# Patient Record
Sex: FEMALE | Race: WHITE | URBAN - METROPOLITAN AREA
[De-identification: names, ages, dates, MRNs, and addresses within clinical notes are randomized per-mention and may not be internally consistent; named-entity substitution may affect disease eponyms.]

---
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
## 2020-02-28 ENCOUNTER — OFFICE VISIT (OUTPATIENT)
Dept: FAMILY MEDICINE CLINIC | Facility: CLINIC | Age: 20
End: 2020-02-28
Payer: COMMERCIAL

## 2020-02-28 VITALS
WEIGHT: 125.81 LBS | RESPIRATION RATE: 20 BRPM | HEART RATE: 80 BPM | HEIGHT: 65 IN | TEMPERATURE: 97 F | SYSTOLIC BLOOD PRESSURE: 110 MMHG | OXYGEN SATURATION: 98 % | DIASTOLIC BLOOD PRESSURE: 80 MMHG | BODY MASS INDEX: 20.96 KG/M2

## 2020-02-28 DIAGNOSIS — G43.109 MIGRAINE WITH AURA AND WITHOUT STATUS MIGRAINOSUS, NOT INTRACTABLE: Primary | ICD-10-CM

## 2020-02-28 PROCEDURE — 99214 OFFICE O/P EST MOD 30 MIN: CPT | Performed by: NURSE PRACTITIONER

## 2020-02-28 NOTE — PATIENT INSTRUCTIONS
follow up with eye doctor for exam     Try Excedrin migraine     Chiropractor, Dr. Carroll Ng [phone]     If weakness, numbness, tingling, slurred speech, drooping on one side of your face or vision changes - call 911     Preventing Migraine He · Avoid triggers if you can. For example, stay clear of alcohol and foods that trigger your headaches. Use unscented household products. Keep regular sleep habits. Manage stress to help control emotional triggers.   · Change your behavior at times when trig

## 2020-02-28 NOTE — PROGRESS NOTES
Lisa Varma is a 23year old female. Patient presents with:   Other: fingers are numb and tingling at times during the migranes, slurs words,   Migraine      HPI:   Complaints of migraines - states she started having then a few years ago once every few domenico NEURO:Cranial nerves II through XII grossly intact, +2 DTR's to all 4 extremities, sensation intact, +5/5 strength to all 4 extremities  PSYCH: alert and oriented x 3 well-groomed, good eye contact, bright affect.   MUSCULOSKELETAL: +5/5 strength all 4 extr · Sleep disruption. Staying up late, sleeping late, and traveling across time zones can disrupt your sleep cycle, triggering headaches. · Hormones. Many women notice that migraines tend to happen at a certain point in their menstrual cycle.  Birth control

## 2021-01-30 ENCOUNTER — OFFICE VISIT (OUTPATIENT)
Dept: FAMILY MEDICINE CLINIC | Facility: CLINIC | Age: 21
End: 2021-01-30
Payer: COMMERCIAL

## 2021-01-30 VITALS
SYSTOLIC BLOOD PRESSURE: 102 MMHG | DIASTOLIC BLOOD PRESSURE: 78 MMHG | RESPIRATION RATE: 16 BRPM | WEIGHT: 145.63 LBS | OXYGEN SATURATION: 98 % | BODY MASS INDEX: 24.26 KG/M2 | HEIGHT: 65 IN | HEART RATE: 89 BPM | TEMPERATURE: 99 F

## 2021-01-30 DIAGNOSIS — L70.0 ACNE VULGARIS: Primary | ICD-10-CM

## 2021-01-30 PROCEDURE — 3074F SYST BP LT 130 MM HG: CPT | Performed by: NURSE PRACTITIONER

## 2021-01-30 PROCEDURE — 99213 OFFICE O/P EST LOW 20 MIN: CPT | Performed by: NURSE PRACTITIONER

## 2021-01-30 PROCEDURE — 3078F DIAST BP <80 MM HG: CPT | Performed by: NURSE PRACTITIONER

## 2021-01-30 PROCEDURE — 3008F BODY MASS INDEX DOCD: CPT | Performed by: NURSE PRACTITIONER

## 2021-01-30 RX ORDER — ETONOGESTREL 68 MG/1
IMPLANT SUBCUTANEOUS
COMMUNITY

## 2021-01-30 RX ORDER — DOXYCYCLINE HYCLATE 50 MG/1
50 TABLET, DELAYED RELEASE ORAL DAILY
Qty: 30 TABLET | Refills: 2 | Status: SHIPPED | OUTPATIENT
Start: 2021-01-30 | End: 2021-02-01

## 2021-01-30 NOTE — PROGRESS NOTES
HPI:    Patient ID: Gerardo Cornelius is a 21year old female. HPI     Present with concern about acne - wants referral to derm. Was on birth control and seemed to help until developed migraines. Has tried many OTC treatments without success.  Has never t Prescriptions Disp Refills   • Doxycycline Hyclate 50 MG Oral Tab EC 30 tablet 2     Sig: Take 50 mg by mouth daily. Imaging & Referrals:  None      Patient Instructions     Take doxycycline daily.  Also take a probiotic, but not at the same time as t directed. Use it after you wash your skin. Apply the medicine to all areas where you get acne. Don’t just treat acne you can see now. New blemishes may be in progress but not in view yet. Treat all the skin. · Don’t squeeze or pick blemishes.  Acne blemish

## 2021-01-30 NOTE — PATIENT INSTRUCTIONS
Take doxycycline daily. Also take a probiotic, but not at the same time as the doxy. If not improving over the next couple of weeks, will refer to derm. Controlling Adult Acne     Look for water-based, oil-free skin care products.  These are less like view yet. Treat all the skin. · Don’t squeeze or pick blemishes. Acne blemishes may heal on their own. But squeezing can cause scarring.  Scars will remain after acne blemishes heal.  · Using sponges, brushes, or other abrasive tools to clean your skin can

## 2021-02-01 ENCOUNTER — TELEPHONE (OUTPATIENT)
Dept: FAMILY MEDICINE CLINIC | Facility: CLINIC | Age: 21
End: 2021-02-01

## 2021-02-01 RX ORDER — DOXYCYCLINE 50 MG/1
50 CAPSULE ORAL DAILY
Qty: 30 CAPSULE | Refills: 1 | Status: SHIPPED | OUTPATIENT
Start: 2021-02-01 | End: 2021-09-27 | Stop reason: ALTCHOICE

## 2021-02-01 NOTE — TELEPHONE ENCOUNTER
Per Reece need to change the doxycycline hyclate to doxycycline monohydrate.  Prescription changed and sent to [facility]

## 2021-03-07 ENCOUNTER — PATIENT MESSAGE (OUTPATIENT)
Dept: FAMILY MEDICINE CLINIC | Facility: CLINIC | Age: 21
End: 2021-03-07

## 2021-03-07 DIAGNOSIS — L70.0 ACNE VULGARIS: Primary | ICD-10-CM

## 2021-03-08 NOTE — TELEPHONE ENCOUNTER
Referral done to [facility]. Please let patient know and have her make sure that they are covered by her insurance. Thank you.

## 2021-03-08 NOTE — TELEPHONE ENCOUNTER
From: Rosalinda Vo  To: Sherren Portela, APRN  Sent: 3/7/2021 2:22 PM CST  Subject: Visit Follow-up Question    Mary Bell,    I took the Doxycycline Monohydrate for a month and I didn't notice any different in my skin.  So I am just wondering what

## 2021-09-27 ENCOUNTER — OFFICE VISIT (OUTPATIENT)
Dept: FAMILY MEDICINE CLINIC | Facility: CLINIC | Age: 21
End: 2021-09-27
Payer: COMMERCIAL

## 2021-09-27 VITALS
SYSTOLIC BLOOD PRESSURE: 100 MMHG | DIASTOLIC BLOOD PRESSURE: 78 MMHG | TEMPERATURE: 97 F | BODY MASS INDEX: 22.33 KG/M2 | HEIGHT: 65 IN | RESPIRATION RATE: 16 BRPM | HEART RATE: 97 BPM | WEIGHT: 134 LBS | OXYGEN SATURATION: 99 %

## 2021-09-27 DIAGNOSIS — Z00.00 WELLNESS EXAMINATION: Primary | ICD-10-CM

## 2021-09-27 DIAGNOSIS — L70.0 ACNE VULGARIS: ICD-10-CM

## 2021-09-27 DIAGNOSIS — M41.20 IDIOPATHIC SCOLIOSIS AND KYPHOSCOLIOSIS: ICD-10-CM

## 2021-09-27 PROCEDURE — 99395 PREV VISIT EST AGE 18-39: CPT | Performed by: NURSE PRACTITIONER

## 2021-09-27 PROCEDURE — 3078F DIAST BP <80 MM HG: CPT | Performed by: NURSE PRACTITIONER

## 2021-09-27 PROCEDURE — 3008F BODY MASS INDEX DOCD: CPT | Performed by: NURSE PRACTITIONER

## 2021-09-27 PROCEDURE — 3074F SYST BP LT 130 MM HG: CPT | Performed by: NURSE PRACTITIONER

## 2021-09-27 RX ORDER — TRETINOIN 0.025 %
1 CREAM (GRAM) TOPICAL DAILY
COMMUNITY
Start: 2021-08-06

## 2021-09-27 RX ORDER — SPIRONOLACTONE 100 MG/1
1 TABLET, FILM COATED ORAL 2 TIMES DAILY
COMMUNITY
Start: 2021-08-13

## 2021-09-27 NOTE — PROGRESS NOTES
HPI:   Davida Rubio is a 24year old female who presents for an Annual Health Visit. Back is hurting more. Hx of scoliosis. Is also working at [employer] and lifting more. Never done physical therapy.      Acne is better - seeing dermatologist.     I oz (66 kg)  02/28/20 : 125 lb 12.8 oz (57.1 kg) (47 %, Z= -0.08)*  08/18/17 : 110 lb (49.9 kg) (25 %, Z= -0.67)*    * Growth percentiles are based on CDC (Girls, 2-20 Years) data. Body mass index is 22.3 kg/m².     Physical Exam  Vitals and nursing note re Judgment: Judgment normal.          ASSESSMENT AND PLAN:   Natalia Pang was seen today for physical.    Diagnoses and all orders for this visit:    Wellness examination    Acne vulgaris    Idiopathic scoliosis and kyphoscoliosis      Patient Instruction

## 2021-09-27 NOTE — PATIENT INSTRUCTIONS
Recommend an x-ray for the evaluation of the scoliosis. Will need to clarify where to have this done to be covered by insurance. Recommend physical therapy for core strengthening, need to again clarify where this can be done under the insurance.     [facility]